# Patient Record
Sex: MALE | Race: WHITE | NOT HISPANIC OR LATINO | Employment: FULL TIME | URBAN - METROPOLITAN AREA
[De-identification: names, ages, dates, MRNs, and addresses within clinical notes are randomized per-mention and may not be internally consistent; named-entity substitution may affect disease eponyms.]

---

## 2023-06-20 DIAGNOSIS — M54.30 SCIATICA, UNSPECIFIED SIDE: ICD-10-CM

## 2023-06-20 PROBLEM — E03.9 HYPOTHYROIDISM: Status: ACTIVE | Noted: 2023-06-20

## 2023-06-20 PROBLEM — M62.81 MUSCLE WEAKNESS: Status: ACTIVE | Noted: 2023-06-20

## 2023-06-20 PROBLEM — H52.209 MYOPIA WITH ASTIGMATISM AND PRESBYOPIA: Status: ACTIVE | Noted: 2023-06-20

## 2023-06-20 PROBLEM — H52.10 MYOPIA WITH ASTIGMATISM AND PRESBYOPIA: Status: ACTIVE | Noted: 2023-06-20

## 2023-06-20 PROBLEM — R06.02 SOB (SHORTNESS OF BREATH): Status: ACTIVE | Noted: 2023-06-20

## 2023-06-20 PROBLEM — R25.2 LIMB CRAMPS: Status: ACTIVE | Noted: 2023-06-20

## 2023-06-20 PROBLEM — M21.372 LEFT FOOT DROP: Status: ACTIVE | Noted: 2023-06-20

## 2023-06-20 PROBLEM — R79.89 ELEVATED SERUM CREATININE: Status: ACTIVE | Noted: 2023-06-20

## 2023-06-20 PROBLEM — E78.5 DYSLIPIDEMIA: Status: ACTIVE | Noted: 2023-06-20

## 2023-06-20 PROBLEM — H61.20 CERUMEN IMPACTION: Status: ACTIVE | Noted: 2023-06-20

## 2023-06-20 PROBLEM — H90.3 BILATERAL SENSORINEURAL HEARING LOSS: Status: ACTIVE | Noted: 2023-06-20

## 2023-06-20 PROBLEM — H66.012 NON-RECURRENT ACUTE SUPPURATIVE OTITIS MEDIA OF LEFT EAR WITH SPONTANEOUS RUPTURE OF TYMPANIC MEMBRANE: Status: ACTIVE | Noted: 2023-06-20

## 2023-06-20 PROBLEM — R53.83 FATIGUE: Status: ACTIVE | Noted: 2023-06-20

## 2023-06-20 PROBLEM — J98.6 DIAPHRAGM PARALYSIS: Status: ACTIVE | Noted: 2023-06-20

## 2023-06-20 PROBLEM — H69.90 EUSTACHIAN TUBE DYSFUNCTION: Status: ACTIVE | Noted: 2023-06-20

## 2023-06-20 PROBLEM — E55.9 VITAMIN D DEFICIENCY: Status: ACTIVE | Noted: 2023-06-20

## 2023-06-20 PROBLEM — H52.4 MYOPIA WITH ASTIGMATISM AND PRESBYOPIA: Status: ACTIVE | Noted: 2023-06-20

## 2023-06-20 PROBLEM — J06.9 ACUTE URI: Status: ACTIVE | Noted: 2023-06-20

## 2023-06-20 RX ORDER — MELOXICAM 15 MG/1
TABLET ORAL
Qty: 30 TABLET | Refills: 3 | Status: SHIPPED | OUTPATIENT
Start: 2023-06-20 | End: 2023-08-10 | Stop reason: ALTCHOICE

## 2023-06-20 RX ORDER — ERGOCALCIFEROL 1.25 MG/1
1 CAPSULE ORAL 2 TIMES WEEKLY
COMMUNITY
End: 2023-08-06

## 2023-06-20 RX ORDER — FLUTICASONE FUROATE AND VILANTEROL 200; 25 UG/1; UG/1
1 POWDER RESPIRATORY (INHALATION) DAILY
COMMUNITY
Start: 2020-05-19

## 2023-06-20 RX ORDER — LEVOTHYROXINE SODIUM 112 UG/1
112 TABLET ORAL
COMMUNITY
End: 2023-08-06

## 2023-06-20 RX ORDER — MELOXICAM 15 MG/1
15 TABLET ORAL DAILY
COMMUNITY
Start: 2023-05-16 | End: 2023-06-20 | Stop reason: SDUPTHER

## 2023-08-06 DIAGNOSIS — E03.9 HYPOTHYROIDISM, UNSPECIFIED: ICD-10-CM

## 2023-08-06 DIAGNOSIS — E55.9 VITAMIN D DEFICIENCY, UNSPECIFIED: ICD-10-CM

## 2023-08-06 RX ORDER — ERGOCALCIFEROL 1.25 MG/1
1 CAPSULE ORAL 2 TIMES WEEKLY
Qty: 26 CAPSULE | Refills: 1 | Status: SHIPPED | OUTPATIENT
Start: 2023-08-07 | End: 2024-01-30

## 2023-08-06 RX ORDER — LEVOTHYROXINE SODIUM 112 UG/1
TABLET ORAL
Qty: 90 TABLET | Refills: 3 | Status: SHIPPED | OUTPATIENT
Start: 2023-08-06 | End: 2024-05-27

## 2023-08-10 ENCOUNTER — TELEPHONE (OUTPATIENT)
Dept: PRIMARY CARE | Facility: CLINIC | Age: 68
End: 2023-08-10

## 2023-08-10 ENCOUNTER — TELEMEDICINE (OUTPATIENT)
Dept: PRIMARY CARE | Facility: CLINIC | Age: 68
End: 2023-08-10
Payer: COMMERCIAL

## 2023-08-10 VITALS — WEIGHT: 198 LBS | TEMPERATURE: 99.5 F | BODY MASS INDEX: 31.48 KG/M2 | HEART RATE: 63 BPM

## 2023-08-10 DIAGNOSIS — G93.31 POSTVIRAL FATIGUE SYNDROME: ICD-10-CM

## 2023-08-10 DIAGNOSIS — G44.201 ACUTE INTRACTABLE TENSION-TYPE HEADACHE: ICD-10-CM

## 2023-08-10 DIAGNOSIS — U07.1 COVID-19 VIRUS INFECTION: Primary | ICD-10-CM

## 2023-08-10 PROCEDURE — 99214 OFFICE O/P EST MOD 30 MIN: CPT | Performed by: INTERNAL MEDICINE

## 2023-08-10 NOTE — TELEPHONE ENCOUNTER
He tested positive for covid, his work needs some type of note, he would like to know if you can add him on for a virtual to discuss covid and what he needs to do.

## 2023-08-10 NOTE — LETTER
August 10, 2023     Patient: Enrrique Blake   YOB: 1955   Date of Visit: 8/10/2023       To Whom It May Concern:    Enrrique Blake was seen in my clinic on 8/10/2023 at 3:45 pm. Please excuse Enrrique for his absence from work from 8/9/23 through 8/14/23 . Due to COVID infection he will need to wear a mask from 8/14/23 - 8/16/23 .    If you have any questions or concerns, please don't hesitate to call.         Sincerely,         Gina Villa MD        CC: No Recipients

## 2023-08-10 NOTE — PROGRESS NOTES
Subjective   Patient ID: Enrrique Blake is a 68 y.o. male who is having a virtual visit today for positive COVID 19 test     Last physical 8/2021      He tested positive for COVID- 19   He started feeling poorly on Sunday 8/6/2023, he tested negative for COVID 8/6/2023.  He went to work on Monday and Tuesday   He felt worse on Tuesday he tested again and he tested positive 8/8/2023  He called his job to let them know he tested positive. It was mandatory that he stay out for 5 days.        HEALTH:  PSA 4/14 , 4/15, 8/16 , 9/14/17 , 1/19 good, 8/2021   Colonoscopy he declines screening   FIT test 8/16   Cologuard ordered 1/19 but not done   Ca cardiac score 9/17 was 8.7  EKG 2014 , 8/16, 8/2021, 10/2022  Urine 1/19, 8/2021   Hep C ab 2010 -  Flu 9/14/17, 10/18,9/ 2020, 11/2022  TDAP 2005 , 7/15  Zostavax never   Pneumovax declines   Prevnar never   Shingrix declines   Pfizer 12/29/2020 and 1/19/2021  Ophth He sees every year and no glaucoma (dad + Hx of glaucoma ) and wears glasses. Last visit in 7/2021 and has new glasses       Review of Systems  All systems negative except those listed in the HPI       Objective   Visit Vitals  Pulse 63   Temp 37.5 °C (99.5 °F) (Temporal)    Body mass index is 31.48 kg/m².      Physical Exam -- No PE- this was a virtual visit     Assessment/Plan   Problem List Items Addressed This Visit    None  Visit Diagnoses       COVID-19 virus infection    -  Primary             Follow up completed    I have communicated my name and active licensure. The patient's identity and physical location were verified at the time of this visit. Either the patient or their legal representative has been informed of the risks and benefits of --   and alternatives to -- treatment through a remote evaluation and consents to proceed with the evaluation remotely.    Positive COVID -19 8/8/2023  He tested positive for COVID- 19   He started feeling poorly on Sunday 8/6/2023, he tested negative for COVID  8/6/2023.  He went to work on Monday and Tuesday   He felt worse on Tuesday he tested again and he tested positive 8/8/2023  He called his job to let them know he tested positive.   His job stated it was mandatory that he stay out for 5 days.  He has a HA in the back of his head. He is taking Ibuprofen prn with good results   Note provided to the patient for being out of work beginning 8/7/2023 and return to work 8/14/2023.   He needs to wear a facial mask for 5 days when he returns to work or when in public.  Encouraged rest and increased hydration with warm/ tepid fluids   Explained Paxlovid to patient in detail. This is not a treatment, it just suppresses the virus.  He is really not having any Sx so I do not recommend he take Paxlovid   Recommend taking Tylenol prn HA   He can use Mucinex, Flonase NS or Xlear NS daily   He will call if Sx persist or worsen    He saw Dr Sky in 10/2022 and notes state his hearing has improved and he will follow again in a year     His weight in office last visit was 198 with BMI of 31.48. We spent 15 minutes discussing diet and weight loss. 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   When he runs he gets out of breath   He lacks energy also   He used to run marathons but can barely run a mile now.  He does interval running with walking and doing the bike between   He is able to do the bike without breathing issues   It depresses him to run because he is unable to get air   He is not sure Breo helps   CXR showed elevated left diaphragm in 2020  ECHO 11/2022 Left ventricular systolic function is normal with a 60-65% estimated ejection fraction. Mild aortic valve regurgitation.  Stress test 11/2022 Resting EKG is normal sinus rhythm with no acute changes. Moderately decreased performance capacity at 4 minutes on a Jose protocol and 5.8 METS. Patient stopped due to fatigue. Resting  heart rate 91 bpm with a rise to 157 bpm which is 101% of predicted maximal. Resting blood pressure was 120/80 with a rise to 172/88. PACs and PVCs noted. No chest discomfort. No ST or T changes to suggest stress-induced ischemia. Low risk for significant obstructive coronary disease  He saw Dr Garcia in 10/2022      BP have been in normal range in office. We will continue to monitor   He will continue to diet and exercise.   EKG was normal 10/2022, no LVH, sinus bradycardia at 48   Recommend he monitor his BP periodically and call with elevated readings     I have spent 15 min face to face with this patient discussing their cardiac risk and behavioral therapies of nutrition choices and exercise. We are trying to eliminate habits that are contributing to their cardiac risk.  We agreed on a plan of how they can reduce their current CV risk   The patient's  10 yr CV risk was estimated at  13.1%     HLD: Labs ordered, we will adjust if indicated 8/2021   Discussed diet and exercise   Ca cardiac score in 9/17 was 8.7 and elevation to left diaphragm     Hypothyroid: Labs ordered, we will adjust if indicated 8/2021   Stable. Continue levothyroxine 125 mcg daily.     He has a lot of increased stressors with work and family.  Running has always helped him with this, he will return to his routine of diet and exercise.   We will continue to monitor.    Sciatica with radiating pain in the left leg and mild left foot drop: Significantly improved.   He still has left neck pain into the left shoulder but this is not affecting his daily activities.   I believe this is spinal stenosis vs herniated disc, he has pain with extension of the left leg and sciatic like Sx.  He was seen in  by Dr Wren on 12/8/18 for LBP. He was diagnosed with sciatica with objective weakness in LLE and started on cyclobenzaprine 10 mg and prednisone 20 mg 3 TID.   MR L/S 2/2023 Compared to the MRI lumbar spine from 12/21/2018, there is  interval increase  in spinal canal stenosis at L3-L4, now moderate, due to degenerative changes including a new prominent left synovial cyst. Left paracentral disc protrusion/extrusion with inferior migration seen on the prior MRI at L3-L4 has resolved.   He may need to see neuro if persists.    He complained of cramps and lower ext weakness especially with running in 2017.  The MRI lumber spine 2012 had some arthritic changes and spinal stenosis L3- 4  Will add mg+ 250 mg before runs and hydrate better    Plantar fascists and heel spur barely there with the orthotics and running is good   On Glucosamine daily     Vit D was 93 in 8/16.   He will continue OTC Vitamin D 2000 units daily.     Mole on left upper back: stable, mildly elevated and will continue to monitor.  The cyst near his buttocks is benign, he has been biking and may need to have this removed     PSA ordered today 8/2021. Testicular exam normal today 8/2021   Explained the importance of daily testicular exams. He is aware of what to look/feel for with this check.  He will call my office if he notices any changes.     He declines a colonoscopy. He has not done the FIT test either.  Orders placed for Cologuard 9/18, he has not done this yet.     Ophth:  He sees every year and no glaucoma (dad + Hx of glaucoma ) and wears glasses.  Last visit in 7/2021 and has new glasses  He will have a copy of his next eye exam faxed to my office in order to update his medical records.     Hep C ab 2010 -  Flu 9/14/17, 10/18,9/ 2020, 11/2022  TDAP 2005 , 7/15  Zostavax never   Pneumovax declines   Prevnar never   Shingrix declines   Pfizer 12/29/2020 and 1/19/2021    Scribe Attestation  By signing my name below, INissa , Ruslan   attest that this documentation has been prepared under the direction and in the presence of Gina Villa MD.

## 2023-09-20 PROBLEM — M99.03 SEGMENTAL AND SOMATIC DYSFUNCTION OF LUMBAR REGION: Status: ACTIVE | Noted: 2023-09-20

## 2023-09-20 PROBLEM — M54.17 LUMBOSACRAL RADICULITIS: Status: ACTIVE | Noted: 2023-09-20

## 2023-09-20 PROBLEM — M99.04 SEGMENTAL AND SOMATIC DYSFUNCTION OF SACRAL REGION: Status: ACTIVE | Noted: 2023-09-20

## 2023-09-20 PROBLEM — E03.9 HYPOTHYROIDISM: Status: ACTIVE | Noted: 2022-04-01

## 2023-09-20 RX ORDER — TIZANIDINE 4 MG/1
4 TABLET ORAL EVERY 8 HOURS PRN
COMMUNITY

## 2023-09-20 RX ORDER — MELOXICAM 15 MG/1
15 TABLET ORAL DAILY
COMMUNITY

## 2023-10-20 ENCOUNTER — CLINICAL SUPPORT (OUTPATIENT)
Dept: AUDIOLOGY | Facility: CLINIC | Age: 68
End: 2023-10-20
Payer: COMMERCIAL

## 2023-10-20 ENCOUNTER — OFFICE VISIT (OUTPATIENT)
Dept: OTOLARYNGOLOGY | Facility: CLINIC | Age: 68
End: 2023-10-20
Payer: COMMERCIAL

## 2023-10-20 VITALS — WEIGHT: 198 LBS | HEIGHT: 67 IN | BODY MASS INDEX: 31.08 KG/M2

## 2023-10-20 DIAGNOSIS — H93.13 TINNITUS OF BOTH EARS: ICD-10-CM

## 2023-10-20 DIAGNOSIS — H90.3 BILATERAL SENSORINEURAL HEARING LOSS: Primary | ICD-10-CM

## 2023-10-20 PROCEDURE — 92567 TYMPANOMETRY: CPT | Performed by: AUDIOLOGIST

## 2023-10-20 PROCEDURE — 1036F TOBACCO NON-USER: CPT | Performed by: OTOLARYNGOLOGY

## 2023-10-20 PROCEDURE — 99213 OFFICE O/P EST LOW 20 MIN: CPT | Performed by: OTOLARYNGOLOGY

## 2023-10-20 PROCEDURE — 1159F MED LIST DOCD IN RCRD: CPT | Performed by: OTOLARYNGOLOGY

## 2023-10-20 PROCEDURE — 1160F RVW MEDS BY RX/DR IN RCRD: CPT | Performed by: OTOLARYNGOLOGY

## 2023-10-20 PROCEDURE — 92557 COMPREHENSIVE HEARING TEST: CPT | Performed by: AUDIOLOGIST

## 2023-10-20 NOTE — PROGRESS NOTES
The patient returns.  We are seeing him back today surveillance recheck history of hearing loss.  For the most part he is doing pretty well.  He denies any dramatic changes.  Not yet interested in hearing amplification.  There have been no significant changes in past medical or past surgical histories except as mentioned.    Exam:  No acute distress.  The external ear structures appear normal. The ear canals patent and the tympanic membranes are intact without evidence of air-fluid levels, retraction, or congenital defects.  Anterior rhinoscopy notes essentially a midline nasal septum. Examination is noted for normal healthy mucosal membranes without any evidence of lesions, polyps, or exudate. The tongue is normally mobile. There are no lesions on the gingiva, buccal, or oral mucosa. There are no oral cavity masses.  The neck is negative for mass lymphadenopathy. The trachea and parotid are clear. The thyroid bed is grossly unremarkable. The salivary gland structures are grossly unremarkable.    Audiogram: Bilateral stable symmetric hearing loss down to 65 dB.    Assessment and plan: Stable bilateral sensorineural hearing loss as described.  Favor observation.  Recommend recheck 1 year, sooner with issue.  All questions were answered in this regard accordingly.

## 2023-10-20 NOTE — PROGRESS NOTES
Mr. Blake, age 68, was seen today for a hearing evaluation during Dr. Sky's clinic for a one year follow up on his hearing and tinnitus.  He reports uncertainty for changes or worsening since his last visit but does notice concern for speech understanding and clarity.    Results:  Otoscopy revealed clear ear canals and tympanic membranes were visualized bilaterally.  Tympanometry revealed normal, Type A tympanograms, indicating normal ear canal volume, peak pressure and compliance bilaterally.  Audiometric thresholds revealed a slight sloping to moderate sensorineural hearing loss bilaterally.  Word recognition were excellent bilaterally.  Hearing levels have remained stable as compared to his last evaluation October 2022.    Recommendations:  Follow-up with PCP, Dr. Villa, as medically directed.  Follow-up with ENT, Dr. Sky, as medically directed.  Consider possible binaural amplification.  Retest hearing annually to monitor.

## 2024-01-30 DIAGNOSIS — E55.9 VITAMIN D DEFICIENCY, UNSPECIFIED: ICD-10-CM

## 2024-01-30 RX ORDER — ERGOCALCIFEROL 1.25 MG/1
1 CAPSULE ORAL 2 TIMES WEEKLY
Qty: 26 CAPSULE | Refills: 1 | Status: SHIPPED | OUTPATIENT
Start: 2024-02-01

## 2024-05-27 DIAGNOSIS — E03.9 HYPOTHYROIDISM, UNSPECIFIED: ICD-10-CM

## 2024-05-27 RX ORDER — LEVOTHYROXINE SODIUM 112 UG/1
112 TABLET ORAL
Qty: 90 TABLET | Refills: 3 | Status: SHIPPED | OUTPATIENT
Start: 2024-05-27

## 2024-07-08 ENCOUNTER — TELEPHONE (OUTPATIENT)
Dept: PRIMARY CARE | Facility: CLINIC | Age: 69
End: 2024-07-08
Payer: COMMERCIAL

## 2024-07-08 NOTE — TELEPHONE ENCOUNTER
Pt set up yearly appointment for Aug. And would like blood work order prior to appt. Okay for when Dr. Villa returns

## 2024-07-09 DIAGNOSIS — R73.09 ABNORMAL GLUCOSE: ICD-10-CM

## 2024-07-09 DIAGNOSIS — E03.9 HYPOTHYROIDISM, UNSPECIFIED TYPE: ICD-10-CM

## 2024-07-09 DIAGNOSIS — E78.5 DYSLIPIDEMIA: Primary | ICD-10-CM

## 2024-07-09 DIAGNOSIS — Z12.5 PROSTATE CANCER SCREENING: ICD-10-CM

## 2024-07-11 ENCOUNTER — LAB (OUTPATIENT)
Dept: LAB | Facility: LAB | Age: 69
End: 2024-07-11
Payer: COMMERCIAL

## 2024-07-11 DIAGNOSIS — Z12.5 PROSTATE CANCER SCREENING: ICD-10-CM

## 2024-07-11 DIAGNOSIS — E03.9 HYPOTHYROIDISM, UNSPECIFIED TYPE: ICD-10-CM

## 2024-07-11 DIAGNOSIS — E78.5 DYSLIPIDEMIA: ICD-10-CM

## 2024-07-11 DIAGNOSIS — R73.09 ABNORMAL GLUCOSE: ICD-10-CM

## 2024-07-11 LAB
ALBUMIN SERPL BCP-MCNC: 3.7 G/DL (ref 3.4–5)
ALP SERPL-CCNC: 22 U/L (ref 33–136)
ALT SERPL W P-5'-P-CCNC: 10 U/L (ref 10–52)
ANION GAP SERPL CALC-SCNC: 13 MMOL/L (ref 10–20)
AST SERPL W P-5'-P-CCNC: 17 U/L (ref 9–39)
BILIRUB SERPL-MCNC: 0.6 MG/DL (ref 0–1.2)
BUN SERPL-MCNC: 27 MG/DL (ref 6–23)
CALCIUM SERPL-MCNC: 9.2 MG/DL (ref 8.6–10.6)
CHLORIDE SERPL-SCNC: 104 MMOL/L (ref 98–107)
CHOLEST SERPL-MCNC: 185 MG/DL (ref 0–199)
CHOLESTEROL/HDL RATIO: 3.8
CO2 SERPL-SCNC: 29 MMOL/L (ref 21–32)
CREAT SERPL-MCNC: 1.23 MG/DL (ref 0.5–1.3)
EGFRCR SERPLBLD CKD-EPI 2021: 64 ML/MIN/1.73M*2
ERYTHROCYTE [DISTWIDTH] IN BLOOD BY AUTOMATED COUNT: 13.6 % (ref 11.5–14.5)
EST. AVERAGE GLUCOSE BLD GHB EST-MCNC: 111 MG/DL
GLUCOSE SERPL-MCNC: 91 MG/DL (ref 74–99)
HBA1C MFR BLD: 5.5 %
HCT VFR BLD AUTO: 44.8 % (ref 41–52)
HDLC SERPL-MCNC: 48.7 MG/DL
HGB BLD-MCNC: 14.8 G/DL (ref 13.5–17.5)
LDLC SERPL CALC-MCNC: 124 MG/DL
MCH RBC QN AUTO: 31.8 PG (ref 26–34)
MCHC RBC AUTO-ENTMCNC: 33 G/DL (ref 32–36)
MCV RBC AUTO: 96 FL (ref 80–100)
NON HDL CHOLESTEROL: 136 MG/DL (ref 0–149)
NRBC BLD-RTO: 0 /100 WBCS (ref 0–0)
PLATELET # BLD AUTO: 165 X10*3/UL (ref 150–450)
POTASSIUM SERPL-SCNC: 4.1 MMOL/L (ref 3.5–5.3)
PROT SERPL-MCNC: 7.9 G/DL (ref 6.4–8.2)
PSA SERPL-MCNC: 0.43 NG/ML
RBC # BLD AUTO: 4.66 X10*6/UL (ref 4.5–5.9)
SODIUM SERPL-SCNC: 142 MMOL/L (ref 136–145)
TRIGL SERPL-MCNC: 60 MG/DL (ref 0–149)
TSH SERPL-ACNC: 1.19 MIU/L (ref 0.44–3.98)
VLDL: 12 MG/DL (ref 0–40)
WBC # BLD AUTO: 7.3 X10*3/UL (ref 4.4–11.3)

## 2024-07-11 PROCEDURE — 83036 HEMOGLOBIN GLYCOSYLATED A1C: CPT

## 2024-07-11 PROCEDURE — 84443 ASSAY THYROID STIM HORMONE: CPT

## 2024-07-11 PROCEDURE — 80061 LIPID PANEL: CPT

## 2024-07-11 PROCEDURE — 80053 COMPREHEN METABOLIC PANEL: CPT

## 2024-07-11 PROCEDURE — 85027 COMPLETE CBC AUTOMATED: CPT

## 2024-07-11 PROCEDURE — 84153 ASSAY OF PSA TOTAL: CPT

## 2024-07-11 PROCEDURE — 36415 COLL VENOUS BLD VENIPUNCTURE: CPT

## 2024-07-12 NOTE — RESULT ENCOUNTER NOTE
Chadd Osuna-  The cholesterol is improved and would still like to get the LDL<100   The glucose was good and A1C was normal range   Thyroid is normal   The PSA is good range   Rest of the labs are good range

## 2024-07-23 DIAGNOSIS — E55.9 VITAMIN D DEFICIENCY, UNSPECIFIED: ICD-10-CM

## 2024-07-23 RX ORDER — ERGOCALCIFEROL 1.25 MG/1
1 CAPSULE ORAL 2 TIMES WEEKLY
Qty: 26 CAPSULE | Refills: 1 | Status: SHIPPED | OUTPATIENT
Start: 2024-07-25

## 2024-07-28 NOTE — PROGRESS NOTES
Subjective   Patient ID: Enrrique Blake is a 69 y.o. male who presents for his annual physical       He continues to have SOB     He started having congestion and cough and it has resolved.  There are days he gets up and feels congested but he is able to clear himself out   The couple times he looked the sputum was yellow in color     His back pain has resolved.   He did acupuncture and felt it was helpful     He is having increased episodes of anxiety since he is not working out as much  He is getting episodes here and there and felt very anxious.  He sometimes feels short with people due to his anxiety     He has been noticing floaters in his eyes more often the past 6 months  Ophth is aware and he is due for an appt with them this year still         HEALTH:  PSA 4/14, 4/15, 8/16, 9/17, 1/19, 8/21, 7/24  Colonoscopy orders placed 7/24  FIT test 8/16   Ca cardiac score 9/17 was 8.7  EKG 2014, 8/16, 8/21, 10/22, 7/24   Urine 1/19, 8/21   Hep C ab 2010 -  Flu 9/17, 10/18, 9/20, 11/22, 11/23  TDAP 2005, 7/15  RSV discussed and will consider   Pneumovax declines   Prevnar 20 7/29/2024  Zostavax never   Shingrix declines   Pfizer 12/29/2020 and 1/5/2021 booster 1/19/2021, 11/5/23  Ophth He sees every year and no glaucoma (dad + Hx of glaucoma ) and wears glasses.        Review of Systems  All systems negative except those listed in the HPI      Past Medical, Surgical, and Family History reviewed and updated in chart.  Reviewed all medications by prescribing practitioner or clinical pharmacist   (such as prescriptions, OTCs, herbal therapies and supplements) and documented in the medical record       Objective   Visit Vitals  /60 (BP Location: Left arm, Patient Position: Sitting)   Pulse 56   Temp 36.2 °C (97.1 °F) (Temporal)    Body mass index is 31.8 kg/m².      Physical Exam  Vitals reviewed.   Constitutional:       Appearance: Normal appearance.   HENT:      Head: Normocephalic.      Right Ear: Tympanic  membrane, ear canal and external ear normal.      Left Ear: Tympanic membrane, ear canal and external ear normal.      Nose: Nose normal.      Mouth/Throat:      Pharynx: Oropharynx is clear.   Eyes:      Conjunctiva/sclera: Conjunctivae normal.   Cardiovascular:      Rate and Rhythm: Normal rate and regular rhythm.      Pulses: Normal pulses.      Heart sounds: Normal heart sounds.   Pulmonary:      Effort: Pulmonary effort is normal.      Breath sounds: Normal breath sounds.   Abdominal:      General: Bowel sounds are normal.      Palpations: Abdomen is soft.   Musculoskeletal:         General: Normal range of motion.      Cervical back: Normal range of motion and neck supple.   Skin:     General: Skin is warm.   Neurological:      General: No focal deficit present.      Mental Status: He is alert and oriented to person, place, and time.   Psychiatric:         Mood and Affect: Mood normal.         Behavior: Behavior normal.         Thought Content: Thought content normal.         Judgment: Judgment normal.       Assessment/Plan   Problem List Items Addressed This Visit       Diaphragm paralysis    Dyslipidemia    Elevated serum creatinine    Eustachian tube dysfunction    Hypothyroidism    RESOLVED: Lumbosacral radiculitis    SOB (shortness of breath)     Other Visit Diagnoses       Healthcare maintenance    -  Primary    BMI 31.0-31.9,adult        Colon cancer screening        Relevant Orders    Colonoscopy Screening; Average Risk Patient    Vitamin D deficiency, unspecified        Relevant Medications    ergocalciferol (Vitamin D-2) 1.25 MG (13140 UT) capsule (Start on 8/4/2024)    Situational anxiety        Relevant Medications    hydrOXYzine pamoate (VistariL) 25 mg capsule    Floater, vitreous, bilateral        Relevant Orders    Referral to Ophthalmology           Annual physical completed  Reviewed his labs from 7/2024     He is  with 1 daughter  He denies previous history of tobacco use.     He  started having congestion and cough and it has resolved.  There are days he gets up and feels congested but he is able to clear himself out   The couple times he looked the sputum was yellow in color   Recommend he start OTC Astelin NS BID and OTC Flonase NS daily            He saw Dr Sky in 10/2023        Audiogram: Bilateral stable symmetric hearing loss down to 65 dB.        Stable bilateral sensorineural hearing loss      His weight in office is 200 with BMI of 31.80. We spent 15 minutes discussing diet and weight loss. 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 Mild aortic valve regurgitation.  Stress test 11/22 PACs and PVCs noted. No chest discomfort. No ST or T changes to suggest stress-induced ischemia. Low risk for significant obstructive coronary disease  He saw Dr Garcia in 10/2022       BP have been in normal range in office. We will continue to monitor   He will continue to diet and exercise.   EKG was normal, HR 56 7/24, no LVH, sinus bradycardia at 48   Recommend he monitor his BP periodically and call with elevated readings      I have spent 15 min face to face with this patient discussing their cardiac risk   We discussed the patients cardiovascular risk. If needed, lifestyle modifications recommended including: behavioral therapies of nutrition choices, exercise and eliminate habits that are contributing to their cardiac risk. We agreed to a plan to decrease his cardiovascular risks. Discussed ASA. Reviewed Guidelines and approved recommendations made to patient.   The patient's 10 yr CV risk was estimated at  11.9 % 7/2024      HLD:  and HDL 48 on labs in 7/24.   Explained goal for LDL to be less than 100 and ideally less than  70   Explained goal for HDL to be between 55- 60. Increase exercise   Ca cardiac score in 9/17 was 8.7 and elevation to left diaphragm   Recommend he look into a plant based/ whole foods diet      Hypothyroid: TSH 1.19 on labs in 7/24  Stable. Continue levothyroxine 125 mcg daily.      Increased stressors with work and family.  He is having increased episodes of anxiety since he is not working out as much  He is getting episodes here and there and felt very anxious.  He sometimes feels short with people due to his anxiety   Prescription sent in for Vistaril 25 mg prn anxiety, possible side effects discussed with medication      Sciatica, radiating pain in the left leg, mild left foot drop: Significantly improved.   He still has left neck pain into the left shoulder but this is not affecting his daily activities. I believe this is spinal stenosis vs herniated disc, he has pain with extension of the left leg and sciatic like Sx.  Seen in UC on 12/8/18 for LBP. He was diagnosed with sciatica with objective weakness in LLE and started on cyclobenzaprine 10 mg and prednisone 20 mg 3 TID.   MR L/S 2/23 Compared to the MRI lumbar spine from 12/21/2018, there is  interval increase in spinal canal stenosis at L3-L4, now moderate, due to degenerative changes including a new prominent left synovial cyst. Left paracentral disc protrusion/extrusion with inferior migration seen on the prior MRI at L3-L4 has resolved.   He did acupuncture and felt it was helpful      He complained of cramps and lower ext weakness especially with running in 2017.  The MRI lumber spine 2012 had some arthritic changes and spinal stenosis L3- 4  Will add mg+ 250 mg before runs and hydrate better     Plantar fascists and heel spur barely there with the orthotics and running is good   On Glucosamine daily      Vit D def: Levels 71 on labs in 5/22  Recommend he discontinue scripted Vitamin D due to concerns for bone loss  Recommend OTC Vitamin D 5000 UT  daily      Mole on left upper back: stable, mildly elevated and will continue to monitor.  The cyst near his buttocks is benign, he has been biking and may need to have this removed      PSA normal 7/24   He will call my office if he notices any changes.      Colon never and ordered 7/24      Ophth: He has been noticing floaters in his eyes more often the past 6 months. Ophth is aware and he is due for an appt with them this year still. Recommend he continue with ophth. Recommend and orders placed for ophthalmology 7/24   He sees every year and no glaucoma (dad + Hx of glaucoma ) and wears glasses.  He will have a copy of his next eye exam faxed to my office in order to update his medical records.      Hep C ab 2010 -  Flu 9/17, 10/18, 9/20, 11/22, 11/23  TDAP 2005, 7/15  RSV discussed and will consider   Pneumovax declines   Prevnar 20 7/29/2024  Zostavax never   Shingrix declines   Pfizer 12/29/2020 and 1/5/2021 booster 1/19/2021, 11/5/23    Some elements in the chart were copied from Dr. Villa's last office visit with patient.   Notes have been updated where appropriate, and reflect my current medical decision making from today.       RTC in 1 year for CPE or sooner if needed   (CPE due 7/2025)      Scribe Attestation  By signing my name below, I, Nissa Farhat , Scribe   attest that this documentation has been prepared under the direction and in the presence of Gina Villa MD.

## 2024-07-29 ENCOUNTER — APPOINTMENT (OUTPATIENT)
Dept: PRIMARY CARE | Facility: CLINIC | Age: 69
End: 2024-07-29
Payer: COMMERCIAL

## 2024-07-29 VITALS
BODY MASS INDEX: 31.39 KG/M2 | HEART RATE: 56 BPM | TEMPERATURE: 97.1 F | WEIGHT: 200 LBS | HEIGHT: 67 IN | DIASTOLIC BLOOD PRESSURE: 60 MMHG | SYSTOLIC BLOOD PRESSURE: 110 MMHG | OXYGEN SATURATION: 97 %

## 2024-07-29 DIAGNOSIS — Z12.11 COLON CANCER SCREENING: ICD-10-CM

## 2024-07-29 DIAGNOSIS — F41.8 SITUATIONAL ANXIETY: ICD-10-CM

## 2024-07-29 DIAGNOSIS — E55.9 VITAMIN D DEFICIENCY, UNSPECIFIED: ICD-10-CM

## 2024-07-29 DIAGNOSIS — M54.17 LUMBOSACRAL RADICULITIS: ICD-10-CM

## 2024-07-29 DIAGNOSIS — H69.93 DYSFUNCTION OF BOTH EUSTACHIAN TUBES: ICD-10-CM

## 2024-07-29 DIAGNOSIS — Z00.00 HEALTHCARE MAINTENANCE: Primary | ICD-10-CM

## 2024-07-29 DIAGNOSIS — E03.9 HYPOTHYROIDISM, UNSPECIFIED TYPE: ICD-10-CM

## 2024-07-29 DIAGNOSIS — R06.02 SOB (SHORTNESS OF BREATH): ICD-10-CM

## 2024-07-29 DIAGNOSIS — R79.89 ELEVATED SERUM CREATININE: ICD-10-CM

## 2024-07-29 DIAGNOSIS — E78.5 DYSLIPIDEMIA: ICD-10-CM

## 2024-07-29 DIAGNOSIS — J98.6 DIAPHRAGM PARALYSIS: ICD-10-CM

## 2024-07-29 DIAGNOSIS — H43.393 FLOATER, VITREOUS, BILATERAL: ICD-10-CM

## 2024-07-29 PROBLEM — M99.03 SEGMENTAL AND SOMATIC DYSFUNCTION OF LUMBAR REGION: Status: RESOLVED | Noted: 2023-09-20 | Resolved: 2024-07-29

## 2024-07-29 PROBLEM — M21.372 LEFT FOOT DROP: Status: RESOLVED | Noted: 2023-06-20 | Resolved: 2024-07-29

## 2024-07-29 PROBLEM — M62.81 MUSCLE WEAKNESS: Status: RESOLVED | Noted: 2023-06-20 | Resolved: 2024-07-29

## 2024-07-29 PROBLEM — R53.83 FATIGUE: Status: RESOLVED | Noted: 2023-06-20 | Resolved: 2024-07-29

## 2024-07-29 PROBLEM — M99.04 SEGMENTAL AND SOMATIC DYSFUNCTION OF SACRAL REGION: Status: RESOLVED | Noted: 2023-09-20 | Resolved: 2024-07-29

## 2024-07-29 PROBLEM — R25.2 LIMB CRAMPS: Status: RESOLVED | Noted: 2023-06-20 | Resolved: 2024-07-29

## 2024-07-29 PROBLEM — M54.30 SCIATICA: Status: RESOLVED | Noted: 2023-06-20 | Resolved: 2024-07-29

## 2024-07-29 PROBLEM — H66.012 NON-RECURRENT ACUTE SUPPURATIVE OTITIS MEDIA OF LEFT EAR WITH SPONTANEOUS RUPTURE OF TYMPANIC MEMBRANE: Status: RESOLVED | Noted: 2023-06-20 | Resolved: 2024-07-29

## 2024-07-29 PROBLEM — J06.9 ACUTE URI: Status: RESOLVED | Noted: 2023-06-20 | Resolved: 2024-07-29

## 2024-07-29 PROCEDURE — 1036F TOBACCO NON-USER: CPT | Performed by: INTERNAL MEDICINE

## 2024-07-29 PROCEDURE — 99397 PER PM REEVAL EST PAT 65+ YR: CPT | Performed by: INTERNAL MEDICINE

## 2024-07-29 PROCEDURE — 93000 ELECTROCARDIOGRAM COMPLETE: CPT | Performed by: INTERNAL MEDICINE

## 2024-07-29 PROCEDURE — 3008F BODY MASS INDEX DOCD: CPT | Performed by: INTERNAL MEDICINE

## 2024-07-29 PROCEDURE — 1160F RVW MEDS BY RX/DR IN RCRD: CPT | Performed by: INTERNAL MEDICINE

## 2024-07-29 PROCEDURE — 90677 PCV20 VACCINE IM: CPT | Performed by: INTERNAL MEDICINE

## 2024-07-29 PROCEDURE — 1159F MED LIST DOCD IN RCRD: CPT | Performed by: INTERNAL MEDICINE

## 2024-07-29 PROCEDURE — 90471 IMMUNIZATION ADMIN: CPT | Performed by: INTERNAL MEDICINE

## 2024-07-29 RX ORDER — HYDROXYZINE PAMOATE 25 MG/1
25 CAPSULE ORAL 3 TIMES DAILY PRN
Qty: 90 CAPSULE | Refills: 3 | Status: SHIPPED | OUTPATIENT
Start: 2024-07-29 | End: 2025-07-29

## 2024-07-29 RX ORDER — ERGOCALCIFEROL 1.25 MG/1
1 CAPSULE ORAL
Qty: 12 CAPSULE | Refills: 3 | Status: SHIPPED | OUTPATIENT
Start: 2024-08-04 | End: 2025-08-04

## 2024-07-29 ASSESSMENT — PATIENT HEALTH QUESTIONNAIRE - PHQ9
2. FEELING DOWN, DEPRESSED OR HOPELESS: NOT AT ALL
1. LITTLE INTEREST OR PLEASURE IN DOING THINGS: NOT AT ALL
SUM OF ALL RESPONSES TO PHQ9 QUESTIONS 1 AND 2: 0

## 2024-07-31 ENCOUNTER — TELEPHONE (OUTPATIENT)
Dept: GASTROENTEROLOGY | Facility: EXTERNAL LOCATION | Age: 69
End: 2024-07-31
Payer: COMMERCIAL

## 2024-08-19 ENCOUNTER — APPOINTMENT (OUTPATIENT)
Dept: PRIMARY CARE | Facility: CLINIC | Age: 69
End: 2024-08-19
Payer: COMMERCIAL

## 2024-08-28 ENCOUNTER — APPOINTMENT (OUTPATIENT)
Dept: PRIMARY CARE | Facility: CLINIC | Age: 69
End: 2024-08-28
Payer: COMMERCIAL

## 2024-11-14 PROBLEM — H61.20 CERUMEN IMPACTION: Status: RESOLVED | Noted: 2023-06-20 | Resolved: 2024-11-14

## 2024-11-21 DIAGNOSIS — F41.8 SITUATIONAL ANXIETY: ICD-10-CM

## 2024-11-21 RX ORDER — HYDROXYZINE PAMOATE 25 MG/1
25 CAPSULE ORAL 3 TIMES DAILY PRN
Qty: 270 CAPSULE | Refills: 1 | Status: SHIPPED | OUTPATIENT
Start: 2024-11-21 | End: 2025-11-21

## 2025-02-04 ENCOUNTER — TELEPHONE (OUTPATIENT)
Dept: PRIMARY CARE | Facility: CLINIC | Age: 70
End: 2025-02-04

## 2025-02-04 ENCOUNTER — OFFICE VISIT (OUTPATIENT)
Dept: PRIMARY CARE | Facility: CLINIC | Age: 70
End: 2025-02-04
Payer: COMMERCIAL

## 2025-02-04 VITALS
TEMPERATURE: 101.7 F | SYSTOLIC BLOOD PRESSURE: 134 MMHG | DIASTOLIC BLOOD PRESSURE: 83 MMHG | HEIGHT: 67 IN | HEART RATE: 86 BPM | WEIGHT: 205.5 LBS | BODY MASS INDEX: 32.25 KG/M2

## 2025-02-04 DIAGNOSIS — J06.9 VIRAL UPPER RESPIRATORY TRACT INFECTION: Primary | ICD-10-CM

## 2025-02-04 DIAGNOSIS — R50.9 FEVER, UNSPECIFIED FEVER CAUSE: ICD-10-CM

## 2025-02-04 LAB
POC RAPID INFLUENZA A: NEGATIVE
POC RAPID INFLUENZA B: NEGATIVE
POC RSV RAPID ANTIGEN: NEGATIVE
POC SARS-COV-2 AG BINAX: NORMAL

## 2025-02-04 PROCEDURE — 87811 SARS-COV-2 COVID19 W/OPTIC: CPT

## 2025-02-04 PROCEDURE — 99213 OFFICE O/P EST LOW 20 MIN: CPT

## 2025-02-04 PROCEDURE — 87804 INFLUENZA ASSAY W/OPTIC: CPT

## 2025-02-04 PROCEDURE — 1036F TOBACCO NON-USER: CPT

## 2025-02-04 PROCEDURE — 1159F MED LIST DOCD IN RCRD: CPT

## 2025-02-04 PROCEDURE — 3008F BODY MASS INDEX DOCD: CPT

## 2025-02-04 PROCEDURE — 87807 RSV ASSAY W/OPTIC: CPT

## 2025-02-04 NOTE — TELEPHONE ENCOUNTER
Pt of Dr Villa's. Calling in stating feeling well since Sunday with a pounding h/a, cough, and phlegm. Will test for covid. Pt is asking what otc meds he can advise.  Using [facility] in [place] on [address]. We do not have any openings at this time. Please advise. Thank you!

## 2025-02-04 NOTE — PROGRESS NOTES
"Subjective   Enrrique Blake is a 69 y.o. male who presents for URI (Cough and kiara (chest), somewheezing when lying down, no sob/Sx started Sunday with migraine/Covid neg this morning).  Upper Respiratory Infection  Patient complains of symptoms of a URI. Symptoms include fever unknown time, cough, congestion, and headache described as one sided coming and going. Onset of symptoms was 3 days ago, and has been unchanged since that time. Treatment to date: tylenol, advil, alkaseltzer cold.          ROS negative unless otherwise stated in HPI.     /83   Pulse 86   Temp (!) 38.7 °C (101.7 °F) (Oral)   Ht 1.689 m (5' 6.5\")   Wt 93.2 kg (205 lb 8 oz)   BMI 32.67 kg/m²    Objective        Physical Exam  Constitutional:       Appearance: Normal appearance.   HENT:      Head: Normocephalic.      Right Ear: Tympanic membrane normal. There is no impacted cerumen.      Left Ear: Tympanic membrane normal. There is no impacted cerumen.      Mouth/Throat:      Mouth: Mucous membranes are moist.      Pharynx: Oropharynx is clear. No oropharyngeal exudate or posterior oropharyngeal erythema.   Eyes:      General:         Right eye: No discharge.         Left eye: No discharge.   Cardiovascular:      Rate and Rhythm: Normal rate and regular rhythm.   Pulmonary:      Effort: Pulmonary effort is normal. No respiratory distress.      Breath sounds: Normal breath sounds. No wheezing, rhonchi or rales.   Musculoskeletal:      Cervical back: Neck supple. No rigidity.   Skin:     General: Skin is warm and dry.   Neurological:      Mental Status: He is alert and oriented to person, place, and time.   Psychiatric:         Mood and Affect: Mood normal.         Assessment & Plan  Fever, unspecified fever cause  All testing was negative today.   Orders:    POCT BinaxNOW Covid-19 Ag Card manually resulted    POCT Influenza A/B manually resulted    POCT Respiratory Syncytial Virus manually resulted    Viral upper respiratory tract " infection  Recommending symptomatic support at this time. His headaches are likely due to fever. If he worsens recommended to follow up. For now I recommended around the clock tylenol to keep the fever down and as a result should resolve the issue of headaches. Encouraged increase hydration during this time as well. Answered all patient questions work note provided.

## 2025-02-04 NOTE — LETTER
February 4, 2025     Patient: Enrrique Blake   YOB: 1955   Date of Visit: 2/4/2025       To Whom It May Concern:    Enrrique Blake was seen in my clinic on 2/4/2025 at 1:40 pm. He is currently ill; Please excuse Enrrique for his absence from work on 2/4/2025-2/06/2025 to make the appointment.    If you have any questions or concerns, please don't hesitate to call.         Sincerely,         Kelli Mcmahon, APRN-CNP        CC: No Recipients

## 2025-05-28 ENCOUNTER — ALLIED HEALTH (OUTPATIENT)
Dept: INTEGRATIVE MEDICINE | Facility: CLINIC | Age: 70
End: 2025-05-28
Payer: COMMERCIAL

## 2025-05-28 DIAGNOSIS — M54.42 ACUTE LEFT-SIDED LOW BACK PAIN WITH LEFT-SIDED SCIATICA: ICD-10-CM

## 2025-05-28 DIAGNOSIS — M53.3 SACRAL BACK PAIN: ICD-10-CM

## 2025-05-28 DIAGNOSIS — M99.04 SEGMENTAL AND SOMATIC DYSFUNCTION OF SACRAL REGION: ICD-10-CM

## 2025-05-28 DIAGNOSIS — M54.16 LUMBAR RADICULOPATHY: ICD-10-CM

## 2025-05-28 DIAGNOSIS — M79.605 PAIN OF LEFT LOWER EXTREMITY: ICD-10-CM

## 2025-05-28 DIAGNOSIS — M79.10 MYALGIA: ICD-10-CM

## 2025-05-28 DIAGNOSIS — M99.02 SEGMENTAL AND SOMATIC DYSFUNCTION OF THORACIC REGION: ICD-10-CM

## 2025-05-28 DIAGNOSIS — M99.03 SEGMENTAL AND SOMATIC DYSFUNCTION OF LUMBAR REGION: Primary | ICD-10-CM

## 2025-05-28 PROCEDURE — 97112 NEUROMUSCULAR REEDUCATION: CPT

## 2025-05-28 PROCEDURE — 98941 CHIROPRACT MANJ 3-4 REGIONS: CPT

## 2025-05-28 NOTE — PROGRESS NOTES
Subjective   Patient ID: Enrrique Blake is a 70 y.o. male who presents May 28, 2025 for acute low back pain.     (1) VPCY    Today, the patient rates their degree of pain as a 8 out of 10 on the numeric pain rating scale.     Enrrique Blake returns today for continued chiropractic care. He was previously in the care of my colleague, Dr. Augustine, but has not been seen for over 2 years. Enrrique started having low back pain again on Monday. He states he was standing up after using the bathroom and felt a sharp pain. This pain is currently radiating down the back of the leg and front of the thigh, not past the knee. He mentions this pain feels the same as it did when he was getting chiropractic care 3-4 years ago. His movement is very limited, he had more pain transitioning from sitting to standing or standing to sitting, and he feels like his left leg is weaker than his right. MRI from 2023 included below. Suspect an exacerbation of prior disc issue.     MRI lumbar (2023) - 1. Compared to the MRI lumbar spine from 12/21/2018, there is  interval increase in spinal canal stenosis at L3-L4, now moderate,  due to degenerative changes including a new prominent left synovial  cyst. Left paracentral disc protrusion/extrusion with inferior  migration seen on the prior MRI at L3-L4 has resolved.     2. Additional multilevel degenerative changes of the lumbar spine    Objective   Physical Exam  Musculoskeletal:      Lumbar back: Tenderness present. Decreased range of motion. Positive left straight leg raise test.        Legs:    Neurological:      General: No focal deficit present.      Mental Status: He is alert and oriented to person, place, and time.      Cranial Nerves: No dysarthria or facial asymmetry.      Sensory: Sensation is intact.      Motor: Motor function is intact.      Coordination: Coordination is intact.      Gait: Gait is intact. Gait normal.       Palpation of the following regions revealed:  Thoracic: Lower  trapezius bilateral, muscular hypertonicity.  Rhomboids bilateral, muscular hypertonicity.  Lumbar: Lumbar paraspinals left, hypertonicity and tenderness.  Quadratus lumborum left, hypertonicity and tenderness.  Thoracolumbar fascia left, hypertonicity and tenderness.  Gluteal left, hypertonicity and tenderness.  Piriformis left, hypertonicity and tenderness.    Segmental Joint(s): Segmental joint dysfunction was assessed with motion palpation and is identified in the following areas:  Thoracic : T8, T9, and T10  Lumbopelvic / Sacral SIJ : L2, L3, L4, L5/S1, and L SIJ    Assessment/Plan   Today's Treatment Included: Spinal manipulation to the following regions of segmental dysfunction identified on examination, using age-appropriate and injury specific force.  Segmental Joint(s) Thoracic : T8, T9, and T10 Segmental Joint(s) Lumbopelvic/Sacral SIJ : L2, L3, L4, L5/S1, and L SIJ  Chiropractic manipulation treatment techniques utilized: Diversified CMT, Pelvic drop table technique, and Flexion-distraction technique  Soft tissue mobilization techniques utilized during treatment: Pin and Stretch and Ischemic compression  Integrative Dry Needling (IDN) - Needles in/out:  9.  Neuromuscular Re-Education (14729): 1 Units; Start time: 8:10a  End time: 8:25a      IDN prone B lumbar PS, B superior cluneal    Soft-tissue mobilization was performed in the following areas:  Lower Trapezius bilateral and Rhomboids bilateral  Lumbar Paraspinal mm. bilateral, Quadratus Lumborum bilateral, Thoracolumbar Fascia bilateral, Gluteal mm. Glute. Max.  and Glute. Med. bilateral, and Piriformis bilateral    Recommended follow-up in 2 day(s).     The patient tolerated today's treatment with little or no additional discomfort and was instructed to contact the office for questions or concerns.

## 2025-05-30 ENCOUNTER — ALLIED HEALTH (OUTPATIENT)
Dept: INTEGRATIVE MEDICINE | Facility: CLINIC | Age: 70
End: 2025-05-30
Payer: COMMERCIAL

## 2025-05-30 ENCOUNTER — APPOINTMENT (OUTPATIENT)
Dept: INTEGRATIVE MEDICINE | Facility: CLINIC | Age: 70
End: 2025-05-30
Payer: COMMERCIAL

## 2025-05-30 DIAGNOSIS — M79.10 MYALGIA: ICD-10-CM

## 2025-05-30 DIAGNOSIS — M99.04 SEGMENTAL AND SOMATIC DYSFUNCTION OF SACRAL REGION: ICD-10-CM

## 2025-05-30 DIAGNOSIS — M54.42 ACUTE LEFT-SIDED LOW BACK PAIN WITH LEFT-SIDED SCIATICA: ICD-10-CM

## 2025-05-30 DIAGNOSIS — M79.605 PAIN OF LEFT LOWER EXTREMITY: ICD-10-CM

## 2025-05-30 DIAGNOSIS — M99.03 SEGMENTAL AND SOMATIC DYSFUNCTION OF LUMBAR REGION: Primary | ICD-10-CM

## 2025-05-30 DIAGNOSIS — M99.02 SEGMENTAL AND SOMATIC DYSFUNCTION OF THORACIC REGION: ICD-10-CM

## 2025-05-30 DIAGNOSIS — M53.3 SACRAL BACK PAIN: ICD-10-CM

## 2025-05-30 DIAGNOSIS — M54.16 LUMBAR RADICULOPATHY: ICD-10-CM

## 2025-05-30 PROCEDURE — 97112 NEUROMUSCULAR REEDUCATION: CPT

## 2025-05-30 PROCEDURE — 98941 CHIROPRACT MANJ 3-4 REGIONS: CPT

## 2025-05-30 NOTE — PROGRESS NOTES
Subjective   Patient ID: Enrrique Blake is a 70 y.o. male who presents May 30, 2025 for acute low back pain.     (2) VPCY    Today, the patient rates their degree of pain as a 6 out of 10 on the numeric pain rating scale.     Enrrique felt better following his last appointment. Still having pain and trouble moving around, getting in certain positions, but walking has been easier despite this. Transitioning to different positions is still the hardest, but he is making sure to be careful while moving and lifting things at work.   ____________________________  Last visit (5/28/2025):  Enrrique Blake returns today for continued chiropractic care. He was previously in the care of my colleague, Dr. Augustine, but has not been seen for over 2 years. Enrrique started having low back pain again on Monday. He states he was standing up after using the bathroom and felt a sharp pain. This pain is currently radiating down the back of the leg and front of the thigh, not past the knee. He mentions this pain feels the same as it did when he was getting chiropractic care 3-4 years ago. His movement is very limited, he had more pain transitioning from sitting to standing or standing to sitting, and he feels like his left leg is weaker than his right. MRI from 2023 included below. Suspect an exacerbation of prior disc issue.       MRI lumbar (2023) - 1. Compared to the MRI lumbar spine from 12/21/2018, there is  interval increase in spinal canal stenosis at L3-L4, now moderate,  due to degenerative changes including a new prominent left synovial  cyst. Left paracentral disc protrusion/extrusion with inferior  migration seen on the prior MRI at L3-L4 has resolved.     2. Additional multilevel degenerative changes of the lumbar spine    Objective   Physical Exam  Musculoskeletal:      Lumbar back: Tenderness present. Decreased range of motion. Positive left straight leg raise test.        Legs:    Neurological:      General: No focal deficit  present.      Mental Status: He is alert and oriented to person, place, and time.      Cranial Nerves: No dysarthria or facial asymmetry.      Sensory: Sensation is intact.      Motor: Motor function is intact.      Coordination: Coordination is intact.      Gait: Gait is intact. Gait normal.         Palpation of the following regions revealed:  Thoracic: Lower trapezius bilateral, muscular hypertonicity.  Rhomboids bilateral, muscular hypertonicity.  Lumbar: Lumbar paraspinals left, hypertonicity and tenderness.  Quadratus lumborum left, hypertonicity and tenderness.  Thoracolumbar fascia left, hypertonicity and tenderness.  Gluteal left, hypertonicity and tenderness.  Piriformis left, hypertonicity and tenderness.    Segmental Joint(s): Segmental joint dysfunction was assessed with motion palpation and is identified in the following areas:  Thoracic : T6, T8, T9, and T10  Lumbopelvic / Sacral SIJ : L2, L3, L4, L5/S1, and L SIJ    Assessment/Plan   Today's Treatment Included: Spinal manipulation to the following regions of segmental dysfunction identified on examination, using age-appropriate and injury specific force.  Segmental Joint(s) Thoracic : T6, T8, T9, and T10 Segmental Joint(s) Lumbopelvic/Sacral SIJ : L2, L3, L4, L5/S1, and L SIJ  Chiropractic manipulation treatment techniques utilized: Diversified CMT, Pelvic drop table technique, and Flexion-distraction technique  Soft tissue mobilization techniques utilized during treatment: Pin and Stretch and Ischemic compression  Integrative Dry Needling (IDN) - Needles in/out:  14.  Neuromuscular Re-Education (19652): 1 Units; Start time: 10:42a  End time: 10:57a      IDN prone B lumbar PS, B superior cluneal, B gluteal, B piriformis    Soft-tissue mobilization was performed in the following areas:  Lower Trapezius bilateral and Rhomboids bilateral  Lumbar Paraspinal mm. bilateral, Quadratus Lumborum bilateral, Thoracolumbar Fascia bilateral, Gluteal mm. Glute.  Max.  and Glute. Med. bilateral, and Piriformis bilateral    Recommended follow-up in 4 day(s).     The patient tolerated today's treatment with little or no additional discomfort and was instructed to contact the office for questions or concerns.

## 2025-06-02 ENCOUNTER — APPOINTMENT (OUTPATIENT)
Dept: INTEGRATIVE MEDICINE | Facility: CLINIC | Age: 70
End: 2025-06-02
Payer: COMMERCIAL

## 2025-06-02 DIAGNOSIS — M79.605 PAIN OF LEFT LOWER EXTREMITY: ICD-10-CM

## 2025-06-02 DIAGNOSIS — M54.16 LUMBAR RADICULOPATHY: ICD-10-CM

## 2025-06-02 DIAGNOSIS — M53.3 SACRAL BACK PAIN: ICD-10-CM

## 2025-06-02 DIAGNOSIS — M99.04 SEGMENTAL AND SOMATIC DYSFUNCTION OF SACRAL REGION: ICD-10-CM

## 2025-06-02 DIAGNOSIS — M79.10 MYALGIA: ICD-10-CM

## 2025-06-02 DIAGNOSIS — M99.03 SEGMENTAL AND SOMATIC DYSFUNCTION OF LUMBAR REGION: Primary | ICD-10-CM

## 2025-06-02 DIAGNOSIS — M99.02 SEGMENTAL AND SOMATIC DYSFUNCTION OF THORACIC REGION: ICD-10-CM

## 2025-06-02 DIAGNOSIS — M54.42 ACUTE LEFT-SIDED LOW BACK PAIN WITH LEFT-SIDED SCIATICA: ICD-10-CM

## 2025-06-02 PROCEDURE — 97112 NEUROMUSCULAR REEDUCATION: CPT

## 2025-06-02 PROCEDURE — 98941 CHIROPRACT MANJ 3-4 REGIONS: CPT

## 2025-06-02 NOTE — PROGRESS NOTES
Subjective   Patient ID: Enrrique Blake is a 70 y.o. male who presents June 2, 2025 for acute low back pain.     (3) VPCY    Today, the patient rates their degree of pain as a 5 out of 10 on the numeric pain rating scale.     Enrrique has continued to improve. He is feeling better again but does still have pain in some motions and certain positions, states he is being careful and going slow with movements and avoiding certain positions still. He is hopeful pain will continue to resolve, thinks that chiropractic care has been helping.   ____________________________  Last visit (5/30/2025):  Enrrique felt better following his last appointment. Still having pain and trouble moving around, getting in certain positions, but walking has been easier despite this. Transitioning to different positions is still the hardest, but he is making sure to be careful while moving and lifting things at work.   ____________________________  Last visit (5/28/2025):  Enrrique Blake returns today for continued chiropractic care. He was previously in the care of my colleague, Dr. Augustine, but has not been seen for over 2 years. Enrrique started having low back pain again on Monday. He states he was standing up after using the bathroom and felt a sharp pain. This pain is currently radiating down the back of the leg and front of the thigh, not past the knee. He mentions this pain feels the same as it did when he was getting chiropractic care 3-4 years ago. His movement is very limited, he had more pain transitioning from sitting to standing or standing to sitting, and he feels like his left leg is weaker than his right. MRI from 2023 included below. Suspect an exacerbation of prior disc issue.       MRI lumbar (2023) - 1. Compared to the MRI lumbar spine from 12/21/2018, there is  interval increase in spinal canal stenosis at L3-L4, now moderate,  due to degenerative changes including a new prominent left synovial  cyst. Left paracentral disc  protrusion/extrusion with inferior  migration seen on the prior MRI at L3-L4 has resolved.     2. Additional multilevel degenerative changes of the lumbar spine    Objective   Physical Exam  Musculoskeletal:      Lumbar back: Tenderness present. Decreased range of motion. Positive left straight leg raise test.        Legs:    Neurological:      General: No focal deficit present.      Mental Status: He is alert and oriented to person, place, and time.      Cranial Nerves: No dysarthria or facial asymmetry.      Sensory: Sensation is intact.      Motor: Motor function is intact.      Coordination: Coordination is intact.      Gait: Gait is intact. Gait normal.         Palpation of the following regions revealed:  Thoracic: Lower trapezius bilateral, muscular hypertonicity.  Rhomboids bilateral, muscular hypertonicity.  Lumbar: Lumbar paraspinals left, hypertonicity and tenderness.  Quadratus lumborum left, hypertonicity and tenderness.  Thoracolumbar fascia left, hypertonicity and tenderness.  Gluteal left, hypertonicity and tenderness.  Piriformis left, hypertonicity and tenderness.    Segmental Joint(s): Segmental joint dysfunction was assessed with motion palpation and is identified in the following areas:  Thoracic : T4, T5, and T6  Lumbopelvic / Sacral SIJ : L2, L3, L4, L5/S1, and L SIJ    Assessment/Plan   Today's Treatment Included: Spinal manipulation to the following regions of segmental dysfunction identified on examination, using age-appropriate and injury specific force.  Segmental Joint(s) Thoracic : T4, T5, and T6 Segmental Joint(s) Lumbopelvic/Sacral SIJ : L2, L3, L4, L5/S1, and L SIJ  Extremity manipulation performed to the following regions:  Lower Extremities : R Hip and L Hip  Chiropractic manipulation treatment techniques utilized: Diversified CMT, Pelvic drop table technique, Flexion-distraction technique, and Long-Axis Traction   Soft tissue mobilization techniques utilized during treatment: Pin  and Stretch and Ischemic compression  Integrative Dry Needling (IDN) - Needles in/out:  9.  Neuromuscular Re-Education (09213): 1 Units; Start time: 8:42a  End time: 8:57a      IDN prone B lumbar PS, L superior cluneal, L gluteal, L piriformis    Soft-tissue mobilization was performed in the following areas:  Lower Trapezius bilateral and Rhomboids bilateral  Lumbar Paraspinal mm. bilateral, Quadratus Lumborum bilateral, Thoracolumbar Fascia bilateral, Gluteal mm. Glute. Max.  and Glute. Med. bilateral, and Piriformis bilateral    Recommended follow-up in 1 week(s).     The patient tolerated today's treatment with little or no additional discomfort and was instructed to contact the office for questions or concerns.

## 2025-06-04 DIAGNOSIS — E03.9 HYPOTHYROIDISM, UNSPECIFIED: ICD-10-CM

## 2025-06-04 DIAGNOSIS — F41.8 SITUATIONAL ANXIETY: ICD-10-CM

## 2025-06-04 RX ORDER — HYDROXYZINE PAMOATE 25 MG/1
25 CAPSULE ORAL 3 TIMES DAILY PRN
Qty: 270 CAPSULE | Refills: 1 | Status: SHIPPED | OUTPATIENT
Start: 2025-06-04 | End: 2026-06-04

## 2025-06-04 RX ORDER — LEVOTHYROXINE SODIUM 112 UG/1
112 TABLET ORAL
Qty: 90 TABLET | Refills: 3 | Status: SHIPPED | OUTPATIENT
Start: 2025-06-04

## 2025-06-09 ENCOUNTER — APPOINTMENT (OUTPATIENT)
Dept: INTEGRATIVE MEDICINE | Facility: CLINIC | Age: 70
End: 2025-06-09
Payer: COMMERCIAL

## 2025-06-09 DIAGNOSIS — M54.42 ACUTE LEFT-SIDED LOW BACK PAIN WITH LEFT-SIDED SCIATICA: ICD-10-CM

## 2025-06-09 DIAGNOSIS — M99.04 SEGMENTAL AND SOMATIC DYSFUNCTION OF SACRAL REGION: ICD-10-CM

## 2025-06-09 DIAGNOSIS — M99.02 SEGMENTAL AND SOMATIC DYSFUNCTION OF THORACIC REGION: ICD-10-CM

## 2025-06-09 DIAGNOSIS — M79.10 MYALGIA: ICD-10-CM

## 2025-06-09 DIAGNOSIS — M53.3 SACRAL BACK PAIN: ICD-10-CM

## 2025-06-09 DIAGNOSIS — M79.605 PAIN OF LEFT LOWER EXTREMITY: ICD-10-CM

## 2025-06-09 DIAGNOSIS — M99.03 SEGMENTAL AND SOMATIC DYSFUNCTION OF LUMBAR REGION: Primary | ICD-10-CM

## 2025-06-09 DIAGNOSIS — M54.16 LUMBAR RADICULOPATHY: ICD-10-CM

## 2025-06-09 PROCEDURE — 98941 CHIROPRACT MANJ 3-4 REGIONS: CPT

## 2025-06-09 PROCEDURE — 97112 NEUROMUSCULAR REEDUCATION: CPT

## 2025-06-09 NOTE — PROGRESS NOTES
Subjective   Patient ID: Enrrique Blake is a 70 y.o. male who presents June 9, 2025 for acute low back pain.     (4) VPCY    Today, the patient rates their degree of pain as a 5 out of 10 on the numeric pain rating scale.     Reports he is still noticing a lot of pain, more in the front and lateral left thigh. It is difficult to move certain ways, get into certain positions for work, he is being very cautious lifting things and reaching for objects. He notices pain the most when he has to twist and reach for something. He was hoping that pain would have resolved by this time.       MRI lumbar (2023) - 1. Compared to the MRI lumbar spine from 12/21/2018, there is  interval increase in spinal canal stenosis at L3-L4, now moderate,  due to degenerative changes including a new prominent left synovial  cyst. Left paracentral disc protrusion/extrusion with inferior  migration seen on the prior MRI at L3-L4 has resolved.     2. Additional multilevel degenerative changes of the lumbar spine    Objective   Physical Exam  Musculoskeletal:      Lumbar back: Tenderness present. Decreased range of motion. Positive left straight leg raise test.        Legs:    Neurological:      General: No focal deficit present.      Mental Status: He is alert and oriented to person, place, and time.      Cranial Nerves: No dysarthria or facial asymmetry.      Sensory: Sensation is intact.      Motor: Motor function is intact.      Coordination: Coordination is intact.      Gait: Gait is intact. Gait normal.       Palpation of the following regions revealed:  Thoracic: Lower trapezius bilateral, muscular hypertonicity.  Rhomboids bilateral, muscular hypertonicity.  Lumbar: Lumbar paraspinals left, hypertonicity and tenderness.  Quadratus lumborum left, hypertonicity and tenderness.  Thoracolumbar fascia left, hypertonicity and tenderness.  Gluteal left, hypertonicity and tenderness.  Piriformis left, hypertonicity and tenderness.  Psoas left,  hypertonicity and tenderness.  Lower Extremity: ITB/TFL left, hypertonicity and tenderness.  Hip abductors left, hypertonicity and tenderness.    Segmental Joint(s): Segmental joint dysfunction was assessed with motion palpation and is identified in the following areas:  Thoracic : T6, T7, and T8  Lumbopelvic / Sacral SIJ : L2, L3, L4, L5/S1, and L SIJ  Lower Extremities : L Hip    Assessment/Plan   Today's Treatment Included: Spinal manipulation to the following regions of segmental dysfunction identified on examination, using age-appropriate and injury specific force.  Segmental Joint(s) Thoracic : T6, T7, and T8 Segmental Joint(s) Lumbopelvic/Sacral SIJ : L2, L3, L4, L5/S1, and L SIJ  Extremity manipulation performed to the following regions:  Lower Extremities : L Hip  Chiropractic manipulation treatment techniques utilized: Diversified CMT, Pelvic drop table technique, Flexion-distraction technique, and Long-Axis Traction   Soft tissue mobilization techniques utilized during treatment: Pin and Stretch and Ischemic compression  Integrative Dry Needling (IDN) - Needles in/out:  5.  Neuromuscular Re-Education (94939): 1 Units; Start time: 8:22a  End time: 8:37a      IDN sidelying L TFL/glute med, L superior cluneal    Soft-tissue mobilization was performed in the following areas:  Lower Trapezius bilateral and Rhomboids bilateral  Lumbar Paraspinal mm. bilateral, Quadratus Lumborum bilateral, Thoracolumbar Fascia bilateral, Gluteal mm. Glute. Max.  and Glute. Med. bilateral, and Piriformis bilateral    Recommended follow-up in 1 week(s).     The patient tolerated today's treatment with little or no additional discomfort and was instructed to contact the office for questions or concerns.

## 2025-06-13 ENCOUNTER — APPOINTMENT (OUTPATIENT)
Dept: INTEGRATIVE MEDICINE | Facility: CLINIC | Age: 70
End: 2025-06-13
Payer: COMMERCIAL

## 2025-06-16 ENCOUNTER — APPOINTMENT (OUTPATIENT)
Dept: INTEGRATIVE MEDICINE | Facility: CLINIC | Age: 70
End: 2025-06-16
Payer: COMMERCIAL

## 2025-06-18 ENCOUNTER — ALLIED HEALTH (OUTPATIENT)
Dept: INTEGRATIVE MEDICINE | Facility: CLINIC | Age: 70
End: 2025-06-18
Payer: COMMERCIAL

## 2025-06-18 DIAGNOSIS — M99.02 SEGMENTAL AND SOMATIC DYSFUNCTION OF THORACIC REGION: ICD-10-CM

## 2025-06-18 DIAGNOSIS — M99.03 SEGMENTAL AND SOMATIC DYSFUNCTION OF LUMBAR REGION: Primary | ICD-10-CM

## 2025-06-18 DIAGNOSIS — M79.605 PAIN OF LEFT LOWER EXTREMITY: ICD-10-CM

## 2025-06-18 DIAGNOSIS — M54.42 ACUTE LEFT-SIDED LOW BACK PAIN WITH LEFT-SIDED SCIATICA: ICD-10-CM

## 2025-06-18 DIAGNOSIS — M54.16 LUMBAR RADICULOPATHY: ICD-10-CM

## 2025-06-18 DIAGNOSIS — M99.04 SEGMENTAL AND SOMATIC DYSFUNCTION OF SACRAL REGION: ICD-10-CM

## 2025-06-18 DIAGNOSIS — M53.3 SACRAL BACK PAIN: ICD-10-CM

## 2025-06-18 DIAGNOSIS — M79.10 MYALGIA: ICD-10-CM

## 2025-06-18 PROCEDURE — 97112 NEUROMUSCULAR REEDUCATION: CPT

## 2025-06-18 PROCEDURE — 98941 CHIROPRACT MANJ 3-4 REGIONS: CPT

## 2025-06-18 NOTE — PROGRESS NOTES
1805: Pt was manually disimpacted by PEDRO Chiang, large amount of stool was removed. Pt states" I feel much better now" Subjective   Patient ID: Enrrique Blake is a 70 y.o. male who presents June 18, 2025 for acute low back pain.     (5) VPCY    Today, the patient rates their degree of pain as a 5 out of 10 on the numeric pain rating scale.     Enrrique has been feeling relatively better, he has continued working, still noticing pain during some movements but is very conscious and moves carefully. He has also started trying to move into more painful positions to stretch. Getting discouraged that pain has not resolved completely yet. Localized to left side gluteal and radiating into lateral thigh to lateral foot. We discussed placing referrals for acupuncture and physical therapy today to start in addition to continuing with chiropractic care.   ____________________________  Last visit (6/9/2025):  Reports he is still noticing a lot of pain, more in the front and lateral left thigh. It is difficult to move certain ways, get into certain positions for work, he is being very cautious lifting things and reaching for objects. He notices pain the most when he has to twist and reach for something. He was hoping that pain would have resolved by this time.       MRI lumbar (2023) - 1. Compared to the MRI lumbar spine from 12/21/2018, there is  interval increase in spinal canal stenosis at L3-L4, now moderate,  due to degenerative changes including a new prominent left synovial  cyst. Left paracentral disc protrusion/extrusion with inferior  migration seen on the prior MRI at L3-L4 has resolved.     2. Additional multilevel degenerative changes of the lumbar spine    Objective   Physical Exam  Musculoskeletal:      Lumbar back: Tenderness present. Decreased range of motion. Positive left straight leg raise test.        Legs:    Neurological:      General: No focal deficit present.      Mental Status: He is alert and oriented to person, place, and time.      Cranial Nerves: No dysarthria or facial asymmetry.      Sensory: Sensation is intact.       Motor: Motor function is intact.      Coordination: Coordination is intact.      Gait: Gait is intact. Gait normal.         Palpation of the following regions revealed:  Thoracic: Lower trapezius bilateral, muscular hypertonicity.  Rhomboids bilateral, muscular hypertonicity.  Lumbar: Lumbar paraspinals left, hypertonicity and tenderness.  Quadratus lumborum left, hypertonicity and tenderness.  Thoracolumbar fascia left, hypertonicity and tenderness.  Gluteal left, hypertonicity and tenderness.  Piriformis left, hypertonicity and tenderness.  Psoas left, hypertonicity and tenderness.  Lower Extremity: ITB/TFL left, hypertonicity and tenderness.  Hip abductors left, hypertonicity and tenderness.    Segmental Joint(s): Segmental joint dysfunction was assessed with motion palpation and is identified in the following areas:  Thoracic : T6, T7, and T8  Lumbopelvic / Sacral SIJ : L2, L3, L4, L5/S1, and L SIJ  Lower Extremities : L Hip    Assessment/Plan   Today's Treatment Included: Spinal manipulation to the following regions of segmental dysfunction identified on examination, using age-appropriate and injury specific force.  Segmental Joint(s) Thoracic : T6, T7, and T8 Segmental Joint(s) Lumbopelvic/Sacral SIJ : L2, L3, L4, L5/S1, and L SIJ  Extremity manipulation performed to the following regions:  Lower Extremities : L Hip  Chiropractic manipulation treatment techniques utilized: Diversified CMT, Pelvic drop table technique, Flexion-distraction technique, and Long-Axis Traction   Soft tissue mobilization techniques utilized during treatment: Pin and Stretch and Ischemic compression  Integrative Dry Needling (IDN) - Needles in/out:  14.  Neuromuscular Re-Education (83613): 1 Units; Start time: 1:45p  End time: 2:02p      IDN prone glute med, glute max, superior cluneal, piriformis all L     Soft-tissue mobilization was performed in the following areas:  Lower Trapezius bilateral and Rhomboids bilateral  Lumbar  Paraspinal mm. bilateral, Quadratus Lumborum bilateral, Thoracolumbar Fascia bilateral, Gluteal mm. Glute. Max.  and Glute. Med. bilateral, and Piriformis bilateral    Recommended follow-up in 1 week(s).     The patient tolerated today's treatment with little or no additional discomfort and was instructed to contact the office for questions or concerns.

## 2025-08-04 ENCOUNTER — TELEPHONE (OUTPATIENT)
Dept: GASTROENTEROLOGY | Facility: EXTERNAL LOCATION | Age: 70
End: 2025-08-04
Payer: COMMERCIAL

## 2025-08-07 ENCOUNTER — APPOINTMENT (OUTPATIENT)
Dept: PRIMARY CARE | Facility: CLINIC | Age: 70
End: 2025-08-07
Payer: COMMERCIAL

## 2025-11-18 ENCOUNTER — APPOINTMENT (OUTPATIENT)
Dept: PRIMARY CARE | Facility: CLINIC | Age: 70
End: 2025-11-18
Payer: COMMERCIAL